# Patient Record
Sex: MALE | Race: WHITE | Employment: OTHER | URBAN - METROPOLITAN AREA
[De-identification: names, ages, dates, MRNs, and addresses within clinical notes are randomized per-mention and may not be internally consistent; named-entity substitution may affect disease eponyms.]

---

## 2022-09-01 ENCOUNTER — HOSPITAL ENCOUNTER (OUTPATIENT)
Dept: MRI IMAGING | Age: 67
Discharge: HOME | End: 2022-09-03
Payer: MEDICARE

## 2022-09-01 DIAGNOSIS — N40.1 BENIGN PROSTATIC HYPERPLASIA WITH LOWER URINARY TRACT SYMPTOMS, SYMPTOM DETAILS UNSPECIFIED: ICD-10-CM

## 2022-09-01 DIAGNOSIS — R97.20 ELEVATED PSA: ICD-10-CM

## 2022-09-01 PROCEDURE — A9579 GAD-BASE MR CONTRAST NOS,1ML: HCPCS | Performed by: UROLOGY

## 2022-09-01 PROCEDURE — 82565 ASSAY OF CREATININE: CPT

## 2022-09-01 PROCEDURE — 2580000003 HC RX 258: Performed by: UROLOGY

## 2022-09-01 PROCEDURE — 72197 MRI PELVIS W/O & W/DYE: CPT

## 2022-09-01 PROCEDURE — 6360000004 HC RX CONTRAST MEDICATION: Performed by: UROLOGY

## 2022-09-01 RX ORDER — SODIUM CHLORIDE 0.9 % (FLUSH) 0.9 %
10 SYRINGE (ML) INJECTION PRN
Status: DISCONTINUED | OUTPATIENT
Start: 2022-09-01 | End: 2022-09-04 | Stop reason: HOSPADM

## 2022-09-01 RX ORDER — 0.9 % SODIUM CHLORIDE 0.9 %
40 INTRAVENOUS SOLUTION INTRAVENOUS ONCE
Status: COMPLETED | OUTPATIENT
Start: 2022-09-01 | End: 2022-09-01

## 2022-09-01 RX ADMIN — GADOTERIDOL 16 ML: 279.3 INJECTION, SOLUTION INTRAVENOUS at 13:10

## 2022-09-01 RX ADMIN — SODIUM CHLORIDE 40 ML: 9 INJECTION, SOLUTION INTRAVENOUS at 13:10

## 2022-09-01 RX ADMIN — SODIUM CHLORIDE, PRESERVATIVE FREE 10 ML: 5 INJECTION INTRAVENOUS at 13:10

## 2022-09-06 LAB
GFR NON-AFRICAN AMERICAN: >60 ML/MIN
GFR SERPL CREATININE-BSD FRML MDRD: >60 ML/MIN
GFR SERPL CREATININE-BSD FRML MDRD: NORMAL ML/MIN/{1.73_M2}
POC CREATININE: 0.8 MG/DL (ref 0.51–1.19)

## 2022-11-02 RX ORDER — PSEUDOEPHEDRINE HCL 30 MG
600 TABLET ORAL NIGHTLY
COMMUNITY

## 2022-11-02 RX ORDER — OXYBUTYNIN CHLORIDE 10 MG/1
10 TABLET, EXTENDED RELEASE ORAL DAILY
COMMUNITY

## 2022-11-02 RX ORDER — DOCUSATE SODIUM 100 MG/1
100 CAPSULE, LIQUID FILLED ORAL 2 TIMES DAILY
COMMUNITY

## 2022-11-02 RX ORDER — MEMANTINE HYDROCHLORIDE 5 MG/1
5 TABLET ORAL 2 TIMES DAILY
COMMUNITY

## 2022-11-02 RX ORDER — POLYETHYLENE GLYCOL 3350 17 G/17G
17 POWDER, FOR SOLUTION ORAL DAILY
COMMUNITY

## 2022-11-02 RX ORDER — LANOLIN ALCOHOL/MO/W.PET/CERES
3 CREAM (GRAM) TOPICAL NIGHTLY
COMMUNITY

## 2022-11-02 RX ORDER — TAMSULOSIN HYDROCHLORIDE 0.4 MG/1
0.4 CAPSULE ORAL 2 TIMES DAILY
COMMUNITY

## 2022-11-02 RX ORDER — OMEGA-3S/DHA/EPA/FISH OIL/D3 300MG-1000
600 CAPSULE ORAL 2 TIMES DAILY
COMMUNITY

## 2022-11-02 RX ORDER — ARIPIPRAZOLE 2 MG/1
2 TABLET ORAL NIGHTLY
COMMUNITY

## 2022-11-02 RX ORDER — SULFAMETHOXAZOLE AND TRIMETHOPRIM 800; 160 MG/1; MG/1
1 TABLET ORAL 2 TIMES DAILY
COMMUNITY

## 2022-11-02 RX ORDER — LISINOPRIL 10 MG/1
10 TABLET ORAL NIGHTLY
COMMUNITY

## 2022-11-03 RX ORDER — CIPROFLOXACIN 500 MG/1
500 TABLET, FILM COATED ORAL 2 TIMES DAILY
COMMUNITY
Start: 2022-11-02

## 2022-11-04 ENCOUNTER — ANESTHESIA (OUTPATIENT)
Dept: OPERATING ROOM | Age: 67
End: 2022-11-04
Payer: MEDICARE

## 2022-11-04 ENCOUNTER — ANESTHESIA EVENT (OUTPATIENT)
Dept: OPERATING ROOM | Age: 67
End: 2022-11-04
Payer: MEDICARE

## 2022-11-04 ENCOUNTER — HOSPITAL ENCOUNTER (OUTPATIENT)
Dept: ULTRASOUND IMAGING | Age: 67
Discharge: HOME | End: 2022-11-06
Attending: UROLOGY
Payer: MEDICARE

## 2022-11-04 ENCOUNTER — HOSPITAL ENCOUNTER (OUTPATIENT)
Age: 67
Setting detail: OUTPATIENT SURGERY
Discharge: HOME | End: 2022-11-04
Attending: UROLOGY | Admitting: UROLOGY
Payer: MEDICARE

## 2022-11-04 VITALS
SYSTOLIC BLOOD PRESSURE: 130 MMHG | BODY MASS INDEX: 22.29 KG/M2 | HEIGHT: 67 IN | HEART RATE: 65 BPM | WEIGHT: 142 LBS | TEMPERATURE: 96.8 F | RESPIRATION RATE: 14 BRPM | DIASTOLIC BLOOD PRESSURE: 79 MMHG | OXYGEN SATURATION: 95 %

## 2022-11-04 DIAGNOSIS — R97.20 ELEVATED PSA: ICD-10-CM

## 2022-11-04 LAB
EGFR, POC: >60 ML/MIN/1.73M2
GLUCOSE BLD-MCNC: 120 MG/DL (ref 74–100)
POC BUN: 25 MG/DL (ref 8–26)
POC CREATININE: 0.96 MG/DL (ref 0.51–1.19)
POC POTASSIUM: 4.7 MMOL/L (ref 3.5–4.5)

## 2022-11-04 PROCEDURE — 84520 ASSAY OF UREA NITROGEN: CPT

## 2022-11-04 PROCEDURE — 84132 ASSAY OF SERUM POTASSIUM: CPT

## 2022-11-04 PROCEDURE — 2709999900 US GUIDED NEEDLE PLACEMENT

## 2022-11-04 PROCEDURE — 3700000001 HC ADD 15 MINUTES (ANESTHESIA): Performed by: UROLOGY

## 2022-11-04 PROCEDURE — 2709999900 HC NON-CHARGEABLE SUPPLY: Performed by: UROLOGY

## 2022-11-04 PROCEDURE — 88344 IMHCHEM/IMCYTCHM EA MLT ANTB: CPT

## 2022-11-04 PROCEDURE — 7100000041 HC SPAR PHASE II RECOVERY - ADDTL 15 MIN: Performed by: UROLOGY

## 2022-11-04 PROCEDURE — 82947 ASSAY GLUCOSE BLOOD QUANT: CPT

## 2022-11-04 PROCEDURE — 3700000000 HC ANESTHESIA ATTENDED CARE: Performed by: UROLOGY

## 2022-11-04 PROCEDURE — 3600000002 HC SURGERY LEVEL 2 BASE: Performed by: UROLOGY

## 2022-11-04 PROCEDURE — 82565 ASSAY OF CREATININE: CPT

## 2022-11-04 PROCEDURE — 7100000040 HC SPAR PHASE II RECOVERY - FIRST 15 MIN: Performed by: UROLOGY

## 2022-11-04 PROCEDURE — 88305 TISSUE EXAM BY PATHOLOGIST: CPT

## 2022-11-04 PROCEDURE — 93005 ELECTROCARDIOGRAM TRACING: CPT | Performed by: ANESTHESIOLOGY

## 2022-11-04 PROCEDURE — 3600000012 HC SURGERY LEVEL 2 ADDTL 15MIN: Performed by: UROLOGY

## 2022-11-04 PROCEDURE — 2580000003 HC RX 258: Performed by: UROLOGY

## 2022-11-04 PROCEDURE — 6360000002 HC RX W HCPCS: Performed by: NURSE ANESTHETIST, CERTIFIED REGISTERED

## 2022-11-04 PROCEDURE — 2500000003 HC RX 250 WO HCPCS: Performed by: UROLOGY

## 2022-11-04 PROCEDURE — 2500000003 HC RX 250 WO HCPCS: Performed by: NURSE ANESTHETIST, CERTIFIED REGISTERED

## 2022-11-04 RX ORDER — ONDANSETRON 2 MG/ML
4 INJECTION INTRAMUSCULAR; INTRAVENOUS
Status: CANCELLED | OUTPATIENT
Start: 2022-11-04 | End: 2022-11-05

## 2022-11-04 RX ORDER — FENTANYL CITRATE 50 UG/ML
25 INJECTION, SOLUTION INTRAMUSCULAR; INTRAVENOUS EVERY 5 MIN PRN
Status: CANCELLED | OUTPATIENT
Start: 2022-11-04

## 2022-11-04 RX ORDER — SODIUM CHLORIDE 0.9 % (FLUSH) 0.9 %
5-40 SYRINGE (ML) INJECTION PRN
Status: CANCELLED | OUTPATIENT
Start: 2022-11-04

## 2022-11-04 RX ORDER — SODIUM CHLORIDE 0.9 % (FLUSH) 0.9 %
5-40 SYRINGE (ML) INJECTION EVERY 12 HOURS SCHEDULED
Status: CANCELLED | OUTPATIENT
Start: 2022-11-04

## 2022-11-04 RX ORDER — MIDAZOLAM HYDROCHLORIDE 1 MG/ML
INJECTION INTRAMUSCULAR; INTRAVENOUS PRN
Status: DISCONTINUED | OUTPATIENT
Start: 2022-11-04 | End: 2022-11-04 | Stop reason: SDUPTHER

## 2022-11-04 RX ORDER — GLYCOPYRROLATE 1 MG/5 ML
SYRINGE (ML) INTRAVENOUS PRN
Status: DISCONTINUED | OUTPATIENT
Start: 2022-11-04 | End: 2022-11-04 | Stop reason: SDUPTHER

## 2022-11-04 RX ORDER — LIDOCAINE HYDROCHLORIDE 10 MG/ML
INJECTION, SOLUTION EPIDURAL; INFILTRATION; INTRACAUDAL; PERINEURAL PRN
Status: DISCONTINUED | OUTPATIENT
Start: 2022-11-04 | End: 2022-11-04 | Stop reason: ALTCHOICE

## 2022-11-04 RX ORDER — PROPOFOL 10 MG/ML
INJECTION, EMULSION INTRAVENOUS PRN
Status: DISCONTINUED | OUTPATIENT
Start: 2022-11-04 | End: 2022-11-04 | Stop reason: SDUPTHER

## 2022-11-04 RX ORDER — SODIUM CHLORIDE 9 MG/ML
INJECTION, SOLUTION INTRAVENOUS PRN
Status: CANCELLED | OUTPATIENT
Start: 2022-11-04

## 2022-11-04 RX ORDER — LIDOCAINE HYDROCHLORIDE 10 MG/ML
INJECTION, SOLUTION EPIDURAL; INFILTRATION; INTRACAUDAL; PERINEURAL PRN
Status: DISCONTINUED | OUTPATIENT
Start: 2022-11-04 | End: 2022-11-04 | Stop reason: SDUPTHER

## 2022-11-04 RX ORDER — FENTANYL CITRATE 50 UG/ML
INJECTION, SOLUTION INTRAMUSCULAR; INTRAVENOUS PRN
Status: DISCONTINUED | OUTPATIENT
Start: 2022-11-04 | End: 2022-11-04 | Stop reason: SDUPTHER

## 2022-11-04 RX ORDER — SODIUM CHLORIDE, SODIUM LACTATE, POTASSIUM CHLORIDE, CALCIUM CHLORIDE 600; 310; 30; 20 MG/100ML; MG/100ML; MG/100ML; MG/100ML
INJECTION, SOLUTION INTRAVENOUS CONTINUOUS
Status: DISCONTINUED | OUTPATIENT
Start: 2022-11-04 | End: 2022-11-04 | Stop reason: HOSPADM

## 2022-11-04 RX ORDER — FENTANYL CITRATE 50 UG/ML
50 INJECTION, SOLUTION INTRAMUSCULAR; INTRAVENOUS EVERY 5 MIN PRN
Status: CANCELLED | OUTPATIENT
Start: 2022-11-04

## 2022-11-04 RX ADMIN — FENTANYL CITRATE 25 MCG: 50 INJECTION, SOLUTION INTRAMUSCULAR; INTRAVENOUS at 13:03

## 2022-11-04 RX ADMIN — FENTANYL CITRATE 25 MCG: 50 INJECTION, SOLUTION INTRAMUSCULAR; INTRAVENOUS at 13:01

## 2022-11-04 RX ADMIN — SODIUM CHLORIDE, POTASSIUM CHLORIDE, SODIUM LACTATE AND CALCIUM CHLORIDE: 600; 310; 30; 20 INJECTION, SOLUTION INTRAVENOUS at 12:45

## 2022-11-04 RX ADMIN — PROPOFOL 50 MG: 10 INJECTION, EMULSION INTRAVENOUS at 12:47

## 2022-11-04 RX ADMIN — FENTANYL CITRATE 25 MCG: 50 INJECTION, SOLUTION INTRAMUSCULAR; INTRAVENOUS at 13:07

## 2022-11-04 RX ADMIN — LIDOCAINE HYDROCHLORIDE 50 MG: 10 INJECTION, SOLUTION EPIDURAL; INFILTRATION; INTRACAUDAL; PERINEURAL at 12:47

## 2022-11-04 RX ADMIN — PROPOFOL 10 MG: 10 INJECTION, EMULSION INTRAVENOUS at 12:54

## 2022-11-04 RX ADMIN — MIDAZOLAM 2 MG: 1 INJECTION INTRAMUSCULAR; INTRAVENOUS at 12:45

## 2022-11-04 RX ADMIN — FENTANYL CITRATE 25 MCG: 50 INJECTION, SOLUTION INTRAMUSCULAR; INTRAVENOUS at 12:54

## 2022-11-04 RX ADMIN — Medication 0.2 MG: at 13:01

## 2022-11-04 RX ADMIN — PROPOFOL 10 MG: 10 INJECTION, EMULSION INTRAVENOUS at 12:49

## 2022-11-04 ASSESSMENT — PAIN SCALES - WONG BAKER
WONGBAKER_NUMERICALRESPONSE: 0

## 2022-11-04 ASSESSMENT — PAIN - FUNCTIONAL ASSESSMENT: PAIN_FUNCTIONAL_ASSESSMENT: WONG-BAKER FACES

## 2022-11-04 NOTE — H&P
(CALCITRATE) 250 MG TABS tablet Take 600 mg by mouth nightly   Yes Historical Provider, MD   lisinopril (PRINIVIL;ZESTRIL) 10 MG tablet Take 10 mg by mouth nightly   Yes Historical Provider, MD   melatonin 3 MG TABS tablet Take 3 mg by mouth nightly   Yes Historical Provider, MD   sulfamethoxazole-trimethoprim (BACTRIM DS;SEPTRA DS) 800-160 MG per tablet Take 1 tablet by mouth 2 times daily   Yes Historical Provider, MD   hydrocortisone 2.5 % cream Apply topically daily To rectal area   Yes Historical Provider, MD       Allergies:  Patient has no known allergies.     Social History:    Social History     Socioeconomic History    Marital status: Single     Spouse name: Not on file    Number of children: Not on file    Years of education: Not on file    Highest education level: Not on file   Occupational History    Not on file   Tobacco Use    Smoking status: Never    Smokeless tobacco: Never   Vaping Use    Vaping Use: Never used   Substance and Sexual Activity    Alcohol use: Never    Drug use: Never    Sexual activity: Not on file   Other Topics Concern    Not on file   Social History Narrative    Not on file     Social Determinants of Health     Financial Resource Strain: Not on file   Food Insecurity: Not on file   Transportation Needs: Not on file   Physical Activity: Not on file   Stress: Not on file   Social Connections: Not on file   Intimate Partner Violence: Not on file   Housing Stability: Not on file       Family History:    Family History   Problem Relation Age of Onset    Hypertension Brother        REVIEW OF SYSTEMS:  Constitutional: negative  Eyes: negative  Respiratory: negative  Cardiovascular: negative  Gastrointestinal: negative  Genitourinary: see HPI  Musculoskeletal: negative  Skin: negative   Neurological: negative  Hematological/Lymphatic: negative  Psychological: negative      Physical Exam:      Patient Vitals for the past 24 hrs:   Height Weight   11/03/22 0832 5' 7\" (1.702 m) 142 lb 12.8 oz (64.8 kg)     Constitutional: Patient in no acute distress; Neuro: alert and oriented to person place and time. Psych: Mood and affect normal.  Lungs: Respiratory effort normal  Cardiovascular:  Normal peripheral pulses. Regular rate. Abdomen: Soft, non-tender, non-distended        LABS:   No results for input(s): WBC, HGB, HCT, MCV, PLT in the last 72 hours. No results for input(s): NA, K, CL, CO2, PHOS, BUN, CREATININE, CA in the last 72 hours. No results found for: PSA    Additional Lab/culture results:    Urinalysis: No results for input(s): COLORU, PHUR, LABCAST, WBCUA, RBCUA, MUCUS, TRICHOMONAS, YEAST, BACTERIA, CLARITYU, SPECGRAV, LEUKOCYTESUR, UROBILINOGEN, BILIRUBINUR, BLOODU in the last 72 hours. Invalid input(s): NITRATE, GLUCOSEUKETONESUAMORPHOUS     -----------------------------------------------------------------  Imaging Results:    Assessment and Plan   Impression:    75 y.o. male with elevated PSA    Plan:   OR today for MRI/TRUS fusion prostate biopsy.     Avelino Cabrera,  Urology Service   11/4/2022 12:05 PM

## 2022-11-04 NOTE — DISCHARGE INSTRUCTIONS
Prostate Biopsy Discharge Instructions:    Complete entire course of antibiotics as prescribed. Take prescriptions as directed. No driving while taking narcotic pain medication. Hold blood thinners after biopsy. Please call attending physician or hospital  with questions. Please call or present to ED for fever >101 F, shaking, chills, intractable nausea and vomiting, or uncontrolled pain. OK to shower after discharge. You may see blood in your urine, stools, and semen for up to 6 weeks. This is normal.   Follow up with Dr. Henny Enriquez in 1-2 weeks to discuss biopsy results. Call office to confirm appointment. No alcoholic beverages, no driving or operating machinery, no making important decisions for 24 hours. You may have a normal diet but should eat lightly day of surgery. Drink plenty of fluids.   Urinate within 8 hours after surgery, if unable to urinate call your doctor

## 2022-11-04 NOTE — PROGRESS NOTES
Discharge instructions gone over with caregiver, pt able to stand pivot/sit in w/c on own, verbalized understanding to d/c instructions.

## 2022-11-04 NOTE — ANESTHESIA PRE PROCEDURE
Department of Anesthesiology  Preprocedure Note       Name:  Andrea Rodriguez   Age:  75 y.o.  :  1955                                          MRN:  [id number]         Date:  2022      Surgeon: Abraham Hendrickson):  Criss Henderson MD    Procedure: Procedure(s):  FUSION PROSTATE BIOPSY    Medications prior to admission:   Prior to Admission medications    Medication Sig Start Date End Date Taking?  Authorizing Provider   ciprofloxacin (CIPRO) 500 MG tablet Take 500 mg by mouth 2 times daily FOR 3 DAYS 22  Yes Historical Provider, MD   oxybutynin (DITROPAN-XL) 10 MG extended release tablet Take 10 mg by mouth daily   Yes Historical Provider, MD   polyethylene glycol (MIRALAX) 17 g PACK packet Take 17 g by mouth daily   Yes Historical Provider, MD   Multiple Vitamins-Iron (TAB-A-VILMA/IRON PO) Take 1 tablet by mouth daily   Yes Historical Provider, MD   Nutritional Supplements (BOOST HIGH PROTEIN PO) Take by mouth 2 times daily (with meals)   Yes Historical Provider, MD   docusate sodium (COLACE) 100 MG capsule Take 100 mg by mouth 2 times daily   Yes Historical Provider, MD   memantine (NAMENDA) 5 MG tablet Take 5 mg by mouth 2 times daily   Yes Historical Provider, MD   tamsulosin (FLOMAX) 0.4 MG capsule Take 0.4 mg by mouth 2 times daily   Yes Historical Provider, MD   vitamin D3 (CHOLECALCIFEROL) 10 MCG (400 UNIT) TABS tablet Take 600 Units by mouth 2 times daily   Yes Historical Provider, MD   ARIPiprazole (ABILIFY) 2 MG tablet Take 2 mg by mouth nightly   Yes Historical Provider, MD   calcium citrate (CALCITRATE) 250 MG TABS tablet Take 600 mg by mouth nightly   Yes Historical Provider, MD   lisinopril (PRINIVIL;ZESTRIL) 10 MG tablet Take 10 mg by mouth nightly   Yes Historical Provider, MD   melatonin 3 MG TABS tablet Take 3 mg by mouth nightly   Yes Historical Provider, MD   sulfamethoxazole-trimethoprim (BACTRIM DS;SEPTRA DS) 800-160 MG per tablet Take 1 tablet by mouth 2 times daily   Yes Historical Provider, MD   hydrocortisone 2.5 % cream Apply topically daily To rectal area   Yes Historical Provider, MD       Current medications:    Current Facility-Administered Medications   Medication Dose Route Frequency Provider Last Rate Last Admin    lactated ringers infusion   IntraVENous Continuous Joseph Oar, MD           Allergies:  No Known Allergies    Problem List:  There is no problem list on file for this patient. Past Medical History:        Diagnosis Date    Anemia     Blind     Bundle branch block, right     Deaf, nonspeaking     Dementia ([facility] [address]     Elevated PSA     Enlarged prostate with lower urinary tract symptoms (LUTS)     Hypertension     Nocturia     Osteoporosis     Seizures (HCC)     Severe intellectual disability        Past Surgical History:        Procedure Laterality Date    PENIS SURGERY      penile ulcers with penile graft       Social History:    Social History     Tobacco Use    Smoking status: Never    Smokeless tobacco: Never   Substance Use Topics    Alcohol use: Never                                Counseling given: Not Answered      Vital Signs (Current):   Vitals:    11/02/22 1541 11/03/22 0832 11/04/22 1143   BP:   135/69   Pulse:   60   Resp:   22   Temp:   97.9 °F (36.6 °C)   SpO2:   100%   Weight: 142 lb 12.8 oz (64.8 kg) 142 lb 12.8 oz (64.8 kg) 142 lb (64.4 kg)   Height: 5' 7\" (1.702 m) 5' 7\" (1.702 m) 5' 7\" (1.702 m)                                              BP Readings from Last 3 Encounters:   11/04/22 135/69       NPO Status: Time of last liquid consumption: 2359                        Time of last solid consumption: 2359                        Date of last liquid consumption: 11/03/22                        Date of last solid food consumption: 11/03/22    BMI:   Wt Readings from Last 3 Encounters:   11/04/22 142 lb (64.4 kg)   09/01/22 165 lb (74.8 kg)     Body mass index is 22.24 kg/m².     CBC: No results found for: WBC, RBC, HGB, HCT, MCV, RDW, PLT    CMP:   Lab Results   Component Value Date/Time    CREATININE 0.96 11/04/2022 12:14 PM    LABGLOM >60 09/01/2022 11:47 AM       POC Tests:   Recent Labs     11/04/22  1214   POCGLU 120*   POCK 4.7*   POCBUN 25       Coags: No results found for: PROTIME, INR, APTT    HCG (If Applicable): No results found for: PREGTESTUR, PREGSERUM, HCG, HCGQUANT     ABGs: No results found for: PHART, PO2ART, JQE9MTA, XQO7THL, BEART, S6KSOZIX     Type & Screen (If Applicable):  No results found for: LABABO, LABRH    Drug/Infectious Status (If Applicable):  No results found for: HIV, HEPCAB    COVID-19 Screening (If Applicable): No results found for: COVID19      EKG 11/4/2022  Sinus bradycardia  Right bundle branch block  Abnormal ECG  No previous ECGs available        Anesthesia Evaluation    Airway: Mallampati: II  TM distance: >3 FB   Neck ROM: full  Mouth opening: > = 3 FB   Dental:    (+) other      Pulmonary:normal exam                               Cardiovascular:    (+) hypertension:,                   Neuro/Psych:   (+) seizures:, psychiatric history:depression/anxiety dementia            GI/Hepatic/Renal: Neg GI/Hepatic/Renal ROS            Endo/Other: Negative Endo/Other ROS                    Abdominal:             Vascular: Other Findings:           Anesthesia Plan      MAC     ASA 3       Induction: intravenous. MIPS: Postoperative opioids intended. Anesthetic plan and risks discussed with patient. Plan discussed with CRNA.                     Trinidad Wynne MD   11/4/2022

## 2022-11-04 NOTE — OP NOTE
Operative Note      Patient: Silvia Ghosh  YOB: 1955  MRN: [id number]    Date of Procedure: 11/4/2022    Pre-Op Diagnosis: ELEVATED PSA    Post-Op Diagnosis: Same       Procedure(s):  FUSION PROSTATE BIOPSY    Surgeon(s):  Heather Elizalde MD    Assistant:   * No surgical staff found *    Anesthesia: Monitor Anesthesia Care    Estimated Blood Loss (mL): Minimal    Complications: None    Specimens:   ID Type Source Tests Collected by Time Destination   A : PROSTATE X12 Tissue Prostate SURGICAL PATHOLOGY Heather Elizalde MD 11/4/2022 1228    B : TARGET LESION #1 RIGHT BASE Tissue Prostate SURGICAL PATHOLOGY Heather Elizalde MD 11/4/2022 1231    C : TARGET LESION #2 LEFT APEX Tissue Prostate SURGICAL PATHOLOGY Heather Elizalde MD 11/4/2022 1234        Implants:  * No implants in log *      Drains: * No LDAs found *    Findings:   Prostate volume-49 cc  Two target lesions with 5 biopsy cores obtained from each side you  Standard 12 core prostate biopsy    Indication for procedure:  Patient is a 69-year-old male who presented with elevated PSA. MRI done showed to target lesions patient presents today for biopsy. H&P was reviewed, informed consent was obtained, patient understood risk, benefits, tenderness of the procedure and wished to proceed. DETAILS OF THE PROCEDURE:  The patient was taken back to the operating room and transferred onto the operating room table. MAC anesthesia was induced appropriately and without issue. The patient was then placed in lateral decubitus position and prepped and draped in the usual sterile fashion. Surgical time-out was performed indicating correct patient, procedure, and positioning. The transrectal ultrasound probe was placed per rectum. The prostate was brought into view. The prostate was measured to be 49 g via transrectal ultrasound measurement.   Seminal vesicles appeared normal.  We then mapped the ultrasound images onto the previously existing MRI images and obtained the 2 target lesions on the right and left apex. 5 biopsy cores were obtained from each site. We then proceeded with a standard sextant biopsy starting on the right side and then the left side for a total of 12 specimens. These core specimens were sent off for permanent pathology. We used 1% lidocaine to inject around the neurovascular bundles bilaterally. After completion of the biopsy we then removed the ultrasound probe. There was no evidence of brisk bleeding per the rectum. The procedure was then terminated. The patient tolerated the procedure well and there were no intraoperative complications. The patient was taken to PACU in stable condition. Dr. Jose Garcia was present for all critical portions of the procedure. DISPOSITION:  The patient was discharged home in stable condition with instructions to follow up in clinic to discuss pathology results in 1-2 weeks. Patient was instructed to call the office or present to ER for any fevers or shaking/chills.             Electronically signed by William Pereira MD on 11/4/2022 at 2:44 PM

## 2022-11-05 LAB
EKG ATRIAL RATE: 58 BPM
EKG P AXIS: -7 DEGREES
EKG P-R INTERVAL: 152 MS
EKG Q-T INTERVAL: 468 MS
EKG QRS DURATION: 120 MS
EKG QTC CALCULATION (BAZETT): 459 MS
EKG R AXIS: 15 DEGREES
EKG T AXIS: 9 DEGREES
EKG VENTRICULAR RATE: 58 BPM

## 2022-11-05 PROCEDURE — 93010 ELECTROCARDIOGRAM REPORT: CPT | Performed by: INTERNAL MEDICINE

## 2022-11-05 NOTE — ANESTHESIA POSTPROCEDURE EVALUATION
Department of Anesthesiology  Postprocedure Note    Patient: Dung Hernández  MRN: [id number]  [place]: 1955  Date of evaluation: 11/5/2022      Procedure Summary     Date: 11/04/22 Room / Location: [address]    Anesthesia Start: 1244 Anesthesia Stop: 4067    Procedure: FUSION PROSTATE BIOPSY Diagnosis:       Elevated PSA      (ELEVATED PSA)    Surgeons: Camacho Eller MD Responsible Provider: Laci Tracey MD    Anesthesia Type: MAC ASA Status: 3          Anesthesia Type: No value filed. Violeta Phase I:      Violeta Phase II: Violeta Score: 9  POST-OP ANESTHESIA NOTE       /79   Pulse 65   Temp 96.8 °F (36 °C) (Temporal)   Resp 14   Ht 5' 7\" (1.702 m)   Wt 142 lb (64.4 kg)   SpO2 95%   BMI 22.24 kg/m²    Pain Assessment: Costello-White FACES            Anesthesia Post Evaluation    Patient location during evaluation: PACU  Patient participation: complete - patient participated  Level of consciousness: awake  Pain scale: FACES.   Airway patency: patent  Nausea & Vomiting: no vomiting and no nausea  Complications: no  Cardiovascular status: hemodynamically stable  Respiratory status: acceptable  Hydration status: stable

## 2022-11-08 LAB — SURGICAL PATHOLOGY REPORT: NORMAL

## (undated) DEVICE — NEEDLE BX ASPIR SPNL TIPCM MRK AND NDL STP 22GAX20CM

## (undated) DEVICE — GLOVE,EXAM,NITRILE,RESTORE,OAT SENSE,L: Brand: MEDLINE

## (undated) DEVICE — TOWEL,OR,DSP,ST,NATURAL,DLX,4/PK,20PK/CS: Brand: MEDLINE